# Patient Record
Sex: MALE | Race: WHITE | Employment: FULL TIME | URBAN - METROPOLITAN AREA
[De-identification: names, ages, dates, MRNs, and addresses within clinical notes are randomized per-mention and may not be internally consistent; named-entity substitution may affect disease eponyms.]

---

## 2019-04-01 PROBLEM — R29.898 WEAKNESS OF RIGHT LOWER EXTREMITY: Status: ACTIVE | Noted: 2019-04-01

## 2019-04-01 PROBLEM — M79.604 PAIN OF RIGHT LOWER EXTREMITY: Status: ACTIVE | Noted: 2019-04-01

## 2020-11-05 ENCOUNTER — TELEPHONE (OUTPATIENT)
Dept: ORTHOPEDICS CLINIC | Facility: CLINIC | Age: 75
End: 2020-11-05

## 2020-11-05 RX ORDER — TRAMADOL HYDROCHLORIDE 50 MG/1
50 TABLET ORAL NIGHTLY PRN
Qty: 40 TABLET | Refills: 0 | Status: SHIPPED | OUTPATIENT
Start: 2020-11-05

## 2020-11-05 NOTE — TELEPHONE ENCOUNTER
Patient's spouse is calling for a med refill on Tramadol. Would like it sent to [address] [place] [address].

## 2020-11-05 NOTE — TELEPHONE ENCOUNTER
RX for Tramadol sent to [place].  I also told her he is able to take Tylenol and Ibuprofen in between for pain relief.

## 2020-12-14 ENCOUNTER — TELEPHONE (OUTPATIENT)
Dept: ORTHOPEDICS CLINIC | Facility: CLINIC | Age: 75
End: 2020-12-14

## 2020-12-14 RX ORDER — TRAMADOL HYDROCHLORIDE 50 MG/1
50 TABLET ORAL NIGHTLY PRN
Qty: 30 TABLET | Refills: 0 | Status: SHIPPED | OUTPATIENT
Start: 2020-12-14

## 2020-12-14 NOTE — TELEPHONE ENCOUNTER
I called and spoke with Michelle Faust wife telling her that [facility] refilled the Tramadol.

## 2020-12-14 NOTE — TELEPHONE ENCOUNTER
Patient's spouse Anna Allenthai is calling wanting to know if Dr. Markus Carlos can refill tramadol medication.

## 2021-01-12 ENCOUNTER — TELEPHONE (OUTPATIENT)
Dept: ORTHOPEDICS CLINIC | Facility: CLINIC | Age: 76
End: 2021-01-12

## 2021-01-12 RX ORDER — TRAMADOL HYDROCHLORIDE 50 MG/1
TABLET ORAL
Qty: 30 TABLET | Refills: 0 | OUTPATIENT
Start: 2021-01-12

## 2021-01-12 NOTE — TELEPHONE ENCOUNTER
Patient's wife called requesting a refill of Tramadol. Lauro Gramajo in [facility] is on file.

## 2021-02-24 RX ORDER — TRAMADOL HYDROCHLORIDE 50 MG/1
50 TABLET ORAL NIGHTLY PRN
Qty: 30 TABLET | Refills: 0 | Status: SHIPPED | OUTPATIENT
Start: 2021-02-24

## 2021-03-26 ENCOUNTER — TELEPHONE (OUTPATIENT)
Dept: ORTHOPEDICS CLINIC | Facility: CLINIC | Age: 76
End: 2021-03-26

## 2021-03-29 RX ORDER — TRAMADOL HYDROCHLORIDE 50 MG/1
50 TABLET ORAL NIGHTLY PRN
Qty: 30 TABLET | Refills: 0 | OUTPATIENT
Start: 2021-03-29

## 2021-03-30 RX ORDER — TRAMADOL HYDROCHLORIDE 50 MG/1
50 TABLET ORAL NIGHTLY PRN
Qty: 20 TABLET | Refills: 0 | Status: SHIPPED | OUTPATIENT
Start: 2021-03-30

## 2021-03-30 NOTE — TELEPHONE ENCOUNTER
Patient's wife scheduled with Dr. Kendra Burnett for 4/16 for pain management medication. Wife is wanting to know if there is any way small script can be sent to pharmacy to hold him over till his appt.

## 2021-03-30 NOTE — TELEPHONE ENCOUNTER
Damien Lorenz contacted wife to let her know that we sent over a script for Tramadol for pain relief until his appointment on April 16th.

## 2021-04-16 ENCOUNTER — OFFICE VISIT (OUTPATIENT)
Dept: ORTHOPEDICS CLINIC | Facility: CLINIC | Age: 76
End: 2021-04-16
Payer: MEDICARE

## 2021-04-16 VITALS — HEIGHT: 67 IN | WEIGHT: 145 LBS | BODY MASS INDEX: 22.76 KG/M2

## 2021-04-16 DIAGNOSIS — M19.011 PRIMARY OSTEOARTHRITIS OF RIGHT SHOULDER: Primary | ICD-10-CM

## 2021-04-16 PROCEDURE — 99212 OFFICE O/P EST SF 10 MIN: CPT | Performed by: ORTHOPAEDIC SURGERY

## 2021-04-16 RX ORDER — MULTIVIT-MIN/IRON FUM/FOLIC AC 7.5 MG-4
1 TABLET ORAL DAILY
COMMUNITY

## 2021-04-16 RX ORDER — BENAZEPRIL HYDROCHLORIDE 20 MG/1
20 TABLET ORAL DAILY
COMMUNITY

## 2021-04-16 RX ORDER — VIT A/VIT C/VIT E/ZINC/COPPER 2148-113
TABLET ORAL
COMMUNITY

## 2021-04-16 RX ORDER — IBUPROFEN 200 MG
200 TABLET ORAL EVERY 6 HOURS PRN
COMMUNITY

## 2021-04-16 RX ORDER — AMLODIPINE BESYLATE 5 MG/1
5 TABLET ORAL DAILY
COMMUNITY

## 2021-04-16 RX ORDER — LABETALOL 200 MG/1
200 TABLET, FILM COATED ORAL 2 TIMES DAILY
COMMUNITY

## 2021-04-16 RX ORDER — TRAMADOL HYDROCHLORIDE 50 MG/1
50 TABLET ORAL NIGHTLY PRN
Qty: 60 TABLET | Refills: 0 | Status: SHIPPED | OUTPATIENT
Start: 2021-04-16 | End: 2021-09-24

## 2021-04-16 RX ORDER — ASPIRIN 81 MG/1
81 TABLET ORAL DAILY
COMMUNITY

## 2021-04-16 NOTE — PROGRESS NOTES
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 Patient has been treated for this problem using tramadol at night for pain management.   Patient does not needed during the day and is able to use anti-inf

## 2021-09-10 DIAGNOSIS — M19.011 PRIMARY OSTEOARTHRITIS OF RIGHT SHOULDER: ICD-10-CM

## 2021-09-10 NOTE — TELEPHONE ENCOUNTER
Per Dr. Jose Wright note from 04/2021, \"Recommendations is to prescribe tramadol 1 tablet 50 mg nightly as needed pain. Give him 60 tablets to be used over the 2-month time. He will need to call for refill.   Okay to give him refills but will need a follo

## 2021-09-17 NOTE — TELEPHONE ENCOUNTER
Patients wife called for an update on medication refill. Requesting a call back as soon as possible.

## 2021-09-24 ENCOUNTER — TELEPHONE (OUTPATIENT)
Dept: ORTHOPEDICS CLINIC | Facility: CLINIC | Age: 76
End: 2021-09-24

## 2021-09-24 RX ORDER — TRAMADOL HYDROCHLORIDE 50 MG/1
50 TABLET ORAL NIGHTLY PRN
Qty: 60 TABLET | Refills: 0 | Status: SHIPPED | OUTPATIENT
Start: 2021-09-24

## 2021-09-24 NOTE — TELEPHONE ENCOUNTER
Wife is calling a second time regarding status of 'sTramadol Refill. Pharmacy states to patient that the refill is waiting on provider authorization. Please advise. He does not take prescription every day, only when needed.  Lauro Gramajo in [place]

## 2021-09-24 NOTE — TELEPHONE ENCOUNTER
Verified voicemail reached with verbal consent signed. Attempted to call Reyna Cruz regarding notification prescription was sent to their preferred phamacy.  Reached voicemail, left voicemail and provided a detail message with my call back contact rafat

## 2021-09-24 NOTE — TELEPHONE ENCOUNTER
Reviewed with Dr. Franc Farnsworth, is only taking once in awhile when pain is increased. Patient will follow up in 6 months.

## 2021-09-26 DIAGNOSIS — M19.011 PRIMARY OSTEOARTHRITIS OF RIGHT SHOULDER: ICD-10-CM

## 2021-09-27 RX ORDER — TRAMADOL HYDROCHLORIDE 50 MG/1
50 TABLET ORAL NIGHTLY PRN
Qty: 60 TABLET | Refills: 0 | OUTPATIENT
Start: 2021-09-27

## 2021-09-27 NOTE — TELEPHONE ENCOUNTER
Patients wife called in regards to prescription again, states the pharmacy is advising they are still waiting on provider authorization.

## 2021-09-27 NOTE — TELEPHONE ENCOUNTER
Rx called into the pharmacy. Patient informed.    Pharmacy  [facility] [place], [place] - [address] [place] [address], [phone]     Outpatient Medication Detail   Disp Refills Start End    TRAMADOL 50 MG Oral Tab 60 tablet 0 9/24/2021

## 2022-01-02 RX ORDER — TRAMADOL HYDROCHLORIDE 50 MG/1
TABLET ORAL
Qty: 60 TABLET | Refills: 0 | OUTPATIENT
Start: 2022-01-02